# Patient Record
Sex: MALE | Race: WHITE | Employment: UNEMPLOYED | URBAN - METROPOLITAN AREA
[De-identification: names, ages, dates, MRNs, and addresses within clinical notes are randomized per-mention and may not be internally consistent; named-entity substitution may affect disease eponyms.]

---
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
## 2022-02-15 ENCOUNTER — OFFICE VISIT (OUTPATIENT)
Dept: FAMILY MEDICINE CLINIC | Age: 22
End: 2022-02-15
Payer: COMMERCIAL

## 2022-02-15 VITALS
SYSTOLIC BLOOD PRESSURE: 128 MMHG | WEIGHT: 266 LBS | RESPIRATION RATE: 18 BRPM | DIASTOLIC BLOOD PRESSURE: 81 MMHG | OXYGEN SATURATION: 97 % | HEART RATE: 83 BPM | BODY MASS INDEX: 40.32 KG/M2 | HEIGHT: 68 IN

## 2022-02-15 DIAGNOSIS — F41.9 ANXIETY: ICD-10-CM

## 2022-02-15 DIAGNOSIS — F32.A DEPRESSION, UNSPECIFIED DEPRESSION TYPE: Primary | ICD-10-CM

## 2022-02-15 PROCEDURE — 99213 OFFICE O/P EST LOW 20 MIN: CPT | Performed by: NURSE PRACTITIONER

## 2022-02-15 RX ORDER — FLUOXETINE HYDROCHLORIDE 40 MG/1
40 CAPSULE ORAL DAILY
Qty: 90 CAPSULE | Refills: 3 | Status: SHIPPED | OUTPATIENT
Start: 2022-02-15

## 2022-02-15 RX ORDER — BUSPIRONE HYDROCHLORIDE 10 MG/1
10 TABLET ORAL DAILY
Qty: 90 TABLET | Refills: 3 | Status: SHIPPED | OUTPATIENT
Start: 2022-02-15

## 2022-02-15 RX ORDER — FLUOXETINE HYDROCHLORIDE 40 MG/1
CAPSULE ORAL
COMMUNITY
Start: 2021-12-15 | End: 2022-02-15 | Stop reason: SDUPTHER

## 2022-02-15 NOTE — PROGRESS NOTES
Chief Complaint   Patient presents with    Medication Check     f/u      1. Have you been to the ER, urgent care clinic since your last visit? Hospitalized since your last visit? No    2. Have you seen or consulted any other health care providers outside of the [address] [place] [address] since your last visit? Include any pap smears or colon screening.  No

## 2022-02-15 NOTE — PROGRESS NOTES
HISTORY OF PRESENT ILLNESS  Kassandra Lau is a 24 y.o. male. HPI  Patient here for refills of prozac and buspar  Had been going to pediatrician but aged out  Stable on meds  Out for 5 days    ROS  A comprehensive review of system was obtained and negative except findings in the HPI    Visit Vitals  /81 (BP 1 Location: Left upper arm, BP Patient Position: Sitting, BP Cuff Size: Large adult)   Pulse 83   Resp 18   Ht 5' 8\" (1.727 m)   Wt 266 lb (120.7 kg)   SpO2 97%   BMI 40.45 kg/m²     Physical Exam  Vitals and nursing note reviewed. Constitutional:       Appearance: Normal appearance. Cardiovascular:      Rate and Rhythm: Normal rate and regular rhythm. Heart sounds: Normal heart sounds. Pulmonary:      Breath sounds: Normal breath sounds. Musculoskeletal:         General: No swelling. Neurological:      Mental Status: He is alert. ASSESSMENT and PLAN  Encounter Diagnoses   Name Primary?  Depression, unspecified depression type Yes    Anxiety      Orders Placed This Encounter    DISCONTD: FLUoxetine (PROzac) 40 mg capsule    busPIRone (BUSPAR) 10 mg tablet    FLUoxetine (PROzac) 40 mg capsule     Refills updated  Follow-up and Dispositions    · Return for near future, fasting labs and check up. I have discussed the diagnosis with the patient and the intended plan as seen in the above orders. The patient has received an after-visit summary and questions were answered concerning future plans. Patient conveyed understanding of the plan at the time of the visit.     Nancie Tamayo, MSN, ANP  2/15/2022

## 2022-03-03 ENCOUNTER — OFFICE VISIT (OUTPATIENT)
Dept: FAMILY MEDICINE CLINIC | Age: 22
End: 2022-03-03
Payer: COMMERCIAL

## 2022-03-03 VITALS
RESPIRATION RATE: 16 BRPM | HEART RATE: 70 BPM | OXYGEN SATURATION: 98 % | BODY MASS INDEX: 39.8 KG/M2 | HEIGHT: 68 IN | TEMPERATURE: 98.2 F | SYSTOLIC BLOOD PRESSURE: 127 MMHG | WEIGHT: 262.6 LBS | DIASTOLIC BLOOD PRESSURE: 85 MMHG

## 2022-03-03 DIAGNOSIS — Z00.00 WELL ADULT EXAM: Primary | ICD-10-CM

## 2022-03-03 PROCEDURE — 99395 PREV VISIT EST AGE 18-39: CPT | Performed by: NURSE PRACTITIONER

## 2022-03-03 NOTE — PROGRESS NOTES
Sonali Landeros is a 24 y.o. male    Chief Complaint   Patient presents with    Labs     Pt is fasting for labs.  Follow-up       1. Have you been to the ER, urgent care clinic since your last visit? Hospitalized since your last visit? No    2. Have you seen or consulted any other health care providers outside of the [address] [place] [address] since your last visit? Include any pap smears or colon screening.  No

## 2022-03-03 NOTE — PROGRESS NOTES
Subjective:   Shireen Leal is a 24 y.o. male presenting for his annual checkup. ROS:  Feeling well. No dyspnea or chest pain on exertion. No abdominal pain, change in bowel habits, black or bloody stools. No urinary tract or prostatic symptoms. No neurological complaints. Specific concerns today: none. Patient Active Problem List    Diagnosis Date Noted    Depression 02/15/2022    Anxiety 02/15/2022     Current Outpatient Medications   Medication Sig Dispense Refill    busPIRone (BUSPAR) 10 mg tablet Take 1 Tablet by mouth daily. 90 Tablet 3    FLUoxetine (PROzac) 40 mg capsule Take 1 Capsule by mouth daily. 90 Capsule 3    promethazine (PHENERGAN) 25 mg tablet TAKE 1 TABLET BY MOUTH EVERY 6 HOURS AS NEEDED FOR NAUSEA 40 Tab 0    omeprazole (PRILOSEC OTC) 20 mg tablet Take 20 mg by mouth daily. Family History   Problem Relation Age of Onset    Hypertension Father     Heart Disease Father     High Cholesterol Father     Obesity Father     Cancer Maternal Grandmother     Obesity Paternal Grandfather     High Cholesterol Paternal Grandfather     Hypertension Paternal Grandfather     Headache Paternal Grandfather      Social History     Tobacco Use    Smoking status: Never Smoker    Smokeless tobacco: Current User   Substance Use Topics    Alcohol use: Yes     Comment: occ             Objective:     Visit Vitals  /85 (BP 1 Location: Left upper arm, BP Patient Position: Sitting, BP Cuff Size: Adult)   Pulse 70   Temp 98.2 °F (36.8 °C) (Oral)   Resp 16   Ht 5' 8\" (1.727 m)   Wt 262 lb 9.6 oz (119.1 kg)   SpO2 98%   BMI 39.93 kg/m²     The patient appears well, alert, oriented x 3, in no distress. ENT normal.  Neck supple. No adenopathy or thyromegaly. JAVON. Lungs are clear, good air entry, no wheezes, rhonchi or rales. S1 and S2 normal, no murmurs, regular rate and rhythm. Abdomen is soft without tenderness, guarding, mass or organomegaly.  exam: deferred.   Extremities show no edema, normal peripheral pulses. Neurological is normal without focal findings. Assessment/Plan:   healthy adult male  lose weight, increase physical activity, follow low fat diet, follow low salt diet, routine labs ordered. Encounter Diagnoses   Name Primary?  Well adult exam Yes     Orders Placed This Encounter    CBC WITH AUTOMATED DIFF    METABOLIC PANEL, COMPREHENSIVE    TSH 3RD GENERATION    LIPID PANEL   . I have discussed the diagnosis with the patient and the intended plan as seen in the above orders. The patient has received an after-visit summary and questions were answered concerning future plans. Patient conveyed understanding of the plan at the time of the visit.     Jacob Schaffer, MSN, ANP  3/3/2022

## 2022-03-04 LAB
ALBUMIN SERPL-MCNC: 4.8 G/DL (ref 4.1–5.2)
ALBUMIN/GLOB SERPL: 2 {RATIO} (ref 1.2–2.2)
ALP SERPL-CCNC: 96 IU/L (ref 44–121)
ALT SERPL-CCNC: 46 IU/L (ref 0–44)
AST SERPL-CCNC: 31 IU/L (ref 0–40)
BASOPHILS # BLD AUTO: 0 X10E3/UL (ref 0–0.2)
BASOPHILS NFR BLD AUTO: 1 %
BILIRUB SERPL-MCNC: 0.5 MG/DL (ref 0–1.2)
BUN SERPL-MCNC: 12 MG/DL (ref 6–20)
BUN/CREAT SERPL: 13 (ref 9–20)
CALCIUM SERPL-MCNC: 9.5 MG/DL (ref 8.7–10.2)
CHLORIDE SERPL-SCNC: 102 MMOL/L (ref 96–106)
CHOLEST SERPL-MCNC: 186 MG/DL (ref 100–199)
CO2 SERPL-SCNC: 23 MMOL/L (ref 20–29)
CREAT SERPL-MCNC: 0.92 MG/DL (ref 0.76–1.27)
EGFR: 121 ML/MIN/1.73
EOSINOPHIL # BLD AUTO: 0 X10E3/UL (ref 0–0.4)
EOSINOPHIL NFR BLD AUTO: 0 %
ERYTHROCYTE [DISTWIDTH] IN BLOOD BY AUTOMATED COUNT: 13.1 % (ref 11.6–15.4)
GLOBULIN SER CALC-MCNC: 2.4 G/DL (ref 1.5–4.5)
GLUCOSE SERPL-MCNC: 81 MG/DL (ref 65–99)
HCT VFR BLD AUTO: 46.5 % (ref 37.5–51)
HDLC SERPL-MCNC: 35 MG/DL
HGB BLD-MCNC: 15.7 G/DL (ref 13–17.7)
IMM GRANULOCYTES # BLD AUTO: 0.1 X10E3/UL (ref 0–0.1)
IMM GRANULOCYTES NFR BLD AUTO: 1 %
IMP & REVIEW OF LAB RESULTS: NORMAL
INTERPRETATION: NORMAL
LDLC SERPL CALC-MCNC: 128 MG/DL (ref 0–99)
LYMPHOCYTES # BLD AUTO: 2 X10E3/UL (ref 0.7–3.1)
LYMPHOCYTES NFR BLD AUTO: 38 %
MCH RBC QN AUTO: 30 PG (ref 26.6–33)
MCHC RBC AUTO-ENTMCNC: 33.8 G/DL (ref 31.5–35.7)
MCV RBC AUTO: 89 FL (ref 79–97)
MONOCYTES # BLD AUTO: 0.4 X10E3/UL (ref 0.1–0.9)
MONOCYTES NFR BLD AUTO: 9 %
NEUTROPHILS # BLD AUTO: 2.6 X10E3/UL (ref 1.4–7)
NEUTROPHILS NFR BLD AUTO: 51 %
PLATELET # BLD AUTO: 221 X10E3/UL (ref 150–450)
POTASSIUM SERPL-SCNC: 4.4 MMOL/L (ref 3.5–5.2)
PROT SERPL-MCNC: 7.2 G/DL (ref 6–8.5)
RBC # BLD AUTO: 5.24 X10E6/UL (ref 4.14–5.8)
SODIUM SERPL-SCNC: 140 MMOL/L (ref 134–144)
TRIGL SERPL-MCNC: 128 MG/DL (ref 0–149)
TSH SERPL DL<=0.005 MIU/L-ACNC: 0.94 UIU/ML (ref 0.45–4.5)
VLDLC SERPL CALC-MCNC: 23 MG/DL (ref 5–40)
WBC # BLD AUTO: 5.2 X10E3/UL (ref 3.4–10.8)

## 2022-03-19 PROBLEM — F41.9 ANXIETY: Status: ACTIVE | Noted: 2022-02-15

## 2022-03-19 PROBLEM — F32.A DEPRESSION: Status: ACTIVE | Noted: 2022-02-15

## 2023-03-02 RX ORDER — BUSPIRONE HYDROCHLORIDE 10 MG/1
TABLET ORAL
Qty: 90 TABLET | Refills: 3 | Status: SHIPPED | OUTPATIENT
Start: 2023-03-02

## 2023-03-02 RX ORDER — FLUOXETINE HYDROCHLORIDE 40 MG/1
CAPSULE ORAL
Qty: 90 CAPSULE | Refills: 3 | Status: SHIPPED | OUTPATIENT
Start: 2023-03-02

## 2024-03-13 RX ORDER — BUSPIRONE HYDROCHLORIDE 10 MG/1
TABLET ORAL
Qty: 90 TABLET | OUTPATIENT
Start: 2024-03-13

## 2024-03-13 RX ORDER — FLUOXETINE HYDROCHLORIDE 40 MG/1
CAPSULE ORAL
Qty: 90 CAPSULE | OUTPATIENT
Start: 2024-03-13

## 2024-07-24 ENCOUNTER — TELEMEDICINE (OUTPATIENT)
Age: 24
End: 2024-07-24
Payer: COMMERCIAL

## 2024-07-24 DIAGNOSIS — F33.1 MODERATE EPISODE OF RECURRENT MAJOR DEPRESSIVE DISORDER (HCC): ICD-10-CM

## 2024-07-24 DIAGNOSIS — F41.9 ANXIETY: Primary | ICD-10-CM

## 2024-07-24 PROCEDURE — 99213 OFFICE O/P EST LOW 20 MIN: CPT | Performed by: NURSE PRACTITIONER

## 2024-07-24 RX ORDER — BUSPIRONE HYDROCHLORIDE 10 MG/1
10 TABLET ORAL DAILY
Qty: 30 TABLET | Refills: 5 | Status: SHIPPED | OUTPATIENT
Start: 2024-07-24

## 2024-07-24 RX ORDER — FLUOXETINE HYDROCHLORIDE 40 MG/1
40 CAPSULE ORAL DAILY
Qty: 30 CAPSULE | Refills: 5 | Status: SHIPPED | OUTPATIENT
Start: 2024-07-24

## 2024-07-24 NOTE — PROGRESS NOTES
Allen Bliss (:  2000) is a 23 y.o. male, Established patient, here for evaluation of the following chief complaint(s):  No chief complaint on file.         Assessment & Plan  The patient is a 23-year-old male, who goes by pronouns he, presents for refills of his depression medication and to discuss weight.    1. Medication refills.  Prescriptions for Prozac 30 mg and BuSpar 5 mg have been renewed.    2. Weight management.  Blood work will be drawn for cpx at next in person visit.    Results    1. Anxiety  2. Moderate episode of recurrent major depressive disorder (HCC)    No follow-ups on file.       Subjective   History of Present Illness  The patient is a 23-year-old male who presents via virtual visit for refills of his depression medication and to discuss weight.    He is seeking a refill of his fluoxetine and buspirone prescriptions, as he has been without them for several months. Previously, these medications were effective in managing his short temper, a change from his preoccupied with snappiness. He takes Prozac 40 mg and BuSpar 10 mg daily in the morning. He expresses a dislike for reliance on medication, having gradually reduced his intake to every other day, then twice a week, and then once a week. His work schedule, from 9 a.m. to 6 p.m., contributes to his difficulty in scheduling appointments. His depression has significantly improved over the years, with fewer depressive episodes occurring once a month. His primary fear is lashing out on a person who does not deserve it. He expresses a desire to have children in the future.    He is concerned about his weight, despite regular gym visits, improved diet, and portion control. Despite these efforts, he loses and gains 5 pounds.   The patient works as a nurse at [employer].   His father was diagnosed with cholesterol and blood pressure issues at a young age.    Review of Systems   A comprehensive review of system was obtained and

## 2025-02-24 RX ORDER — BUSPIRONE HYDROCHLORIDE 10 MG/1
10 TABLET ORAL DAILY
Qty: 30 TABLET | Refills: 5 | Status: SHIPPED | OUTPATIENT
Start: 2025-02-24

## 2025-02-24 RX ORDER — FLUOXETINE HYDROCHLORIDE 40 MG/1
40 CAPSULE ORAL DAILY
Qty: 30 CAPSULE | Refills: 5 | Status: SHIPPED | OUTPATIENT
Start: 2025-02-24

## 2025-03-02 SDOH — ECONOMIC STABILITY: INCOME INSECURITY: IN THE LAST 12 MONTHS, WAS THERE A TIME WHEN YOU WERE NOT ABLE TO PAY THE MORTGAGE OR RENT ON TIME?: NO

## 2025-03-02 SDOH — ECONOMIC STABILITY: FOOD INSECURITY: WITHIN THE PAST 12 MONTHS, YOU WORRIED THAT YOUR FOOD WOULD RUN OUT BEFORE YOU GOT MONEY TO BUY MORE.: NEVER TRUE

## 2025-03-02 SDOH — ECONOMIC STABILITY: TRANSPORTATION INSECURITY
IN THE PAST 12 MONTHS, HAS LACK OF TRANSPORTATION KEPT YOU FROM MEETINGS, WORK, OR FROM GETTING THINGS NEEDED FOR DAILY LIVING?: NO

## 2025-03-02 SDOH — ECONOMIC STABILITY: FOOD INSECURITY: WITHIN THE PAST 12 MONTHS, THE FOOD YOU BOUGHT JUST DIDN'T LAST AND YOU DIDN'T HAVE MONEY TO GET MORE.: NEVER TRUE

## 2025-03-02 SDOH — ECONOMIC STABILITY: TRANSPORTATION INSECURITY
IN THE PAST 12 MONTHS, HAS THE LACK OF TRANSPORTATION KEPT YOU FROM MEDICAL APPOINTMENTS OR FROM GETTING MEDICATIONS?: NO

## 2025-03-05 ENCOUNTER — TELEMEDICINE (OUTPATIENT)
Facility: CLINIC | Age: 25
End: 2025-03-05

## 2025-03-05 DIAGNOSIS — F41.9 ANXIETY: ICD-10-CM

## 2025-03-05 DIAGNOSIS — K21.9 GASTROESOPHAGEAL REFLUX DISEASE WITHOUT ESOPHAGITIS: ICD-10-CM

## 2025-03-05 DIAGNOSIS — Z00.00 WELL EXAM WITHOUT ABNORMAL FINDINGS OF PATIENT 18 YEARS OF AGE OR OLDER: Primary | ICD-10-CM

## 2025-03-05 DIAGNOSIS — F33.1 MODERATE EPISODE OF RECURRENT MAJOR DEPRESSIVE DISORDER (HCC): ICD-10-CM

## 2025-03-05 ASSESSMENT — PATIENT HEALTH QUESTIONNAIRE - PHQ9
SUM OF ALL RESPONSES TO PHQ QUESTIONS 1-9: 0
SUM OF ALL RESPONSES TO PHQ QUESTIONS 1-9: 0
1. LITTLE INTEREST OR PLEASURE IN DOING THINGS: NOT AT ALL
2. FEELING DOWN, DEPRESSED OR HOPELESS: NOT AT ALL
SUM OF ALL RESPONSES TO PHQ QUESTIONS 1-9: 0
SUM OF ALL RESPONSES TO PHQ QUESTIONS 1-9: 0

## 2025-03-05 NOTE — PROGRESS NOTES
Chief Complaint   Patient presents with    Follow-up Chronic Condition     \"Have you been to the ER, urgent care clinic since your last visit?  Hospitalized since your last visit?\"    Yes, [facility] 1/28/2025 for TMJ and Jaw muscle pain    “Have you seen or consulted any other health care providers outside of [facility] since your last visit?”    NO     No data recorded    PHQ-9 Total Score: 0 (3/5/2025  4:17 PM)       [facility] Vit[facility]       Question 3/2/2025 10:28 AM EST - Filed by Patient    What is your height? 5’8”    What is your weight in pounds? 280    What is your top number blood pressure reading?       What is your bottom number blood pressure reading?       What is your pulse?       What is your temperature in Fahrenheit?        If you have a pulse oximeter, enter the reading here:       Patients with chronic lung disease who have a Peak Flow meter, please enter the reading here:             [facility] Social Determinants Of Health (Sdoh) Screening Questionnaire       Question 3/2/2025 10:29 AM EST - Filed by Patient    Within the past 12 months, you worried that your food would run out before you got the money to buy more. Never true    Within the past 12 months, the food you bought just didn't last and you didn't have money to get more. Never true    In the past 12 months, has lack of transportation kept you from medical appointments or from getting medications? No    In the past 12 months, has lack of transportation kept you from meetings, work, or from getting things needed for daily living? No    In the last 12 months, was there a time when you were not able to pay the mortgage or rent on time? No    In the past 12 months, how many times have you moved where you were living?       At any time in the past 12 months, were you homeless or living in a shelter (including now)? No    In the past 12 months has the electric, gas, oil, or water company threatened to shut

## 2025-03-06 NOTE — PROGRESS NOTES
Allen Bliss (:  2000) is a 24 y.o. male, Established patient, here for evaluation of the following chief complaint(s):  Follow-up Chronic Condition         Assessment & Plan  1. Medication Refill.  Prescriptions for BuSpar and Prozac have been renewed.    2. Weight Gain.  A comprehensive panel of laboratory tests has been ordered, including thyroid function, cholesterol levels, blood glucose, kidney function, liver function, complete blood count, cardiac markers, and electrolyte levels. The patient has been advised to schedule an appointment at [facility] for these tests. Pending the results of these tests, a discussion regarding potential medication adjustments will be initiated.    Results    1. Well exam without abnormal findings of patient 18 years of age or older  -     Lipid Panel  -     TSH  -     CBC with Auto Differential  -     Comprehensive Metabolic Panel  -     Hemoglobin A1C  2. Gastroesophageal reflux disease without esophagitis  3. Anxiety  4. Moderate episode of recurrent major depressive disorder (HCC)    No follow-ups on file.       Subjective   History of Present Illness  The patient presents via virtual visit for medication refill and weight gain.    He has exhausted his supply of BuSpar and Prozac, necessitating a refill.    He has been experiencing persistent weight gain, the cause of which remains undetermined. Despite his busy schedule, he is seeking to undergo laboratory tests at [facility] to investigate this issue. He anticipates potential abnormalities in his liver enzymes due to a history of alcohol consumption, although he has recently made significant reductions in his intake. His current alcohol consumption is limited to beer, consumed one or two nights per week, a marked decrease from his previous daily habit. He has also eliminated liquor from his diet. He expresses frustration over his inability to lose weight, regardless of his dietary intake.    SOCIAL HISTORY  The

## 2025-08-14 RX ORDER — FLUOXETINE HYDROCHLORIDE 40 MG/1
40 CAPSULE ORAL DAILY
Qty: 60 CAPSULE | Refills: 1 | Status: SHIPPED | OUTPATIENT
Start: 2025-08-14

## 2025-09-04 RX ORDER — BUSPIRONE HYDROCHLORIDE 10 MG/1
10 TABLET ORAL DAILY
Qty: 30 TABLET | Refills: 5 | Status: SHIPPED | OUTPATIENT
Start: 2025-09-04